# Patient Record
Sex: MALE
[De-identification: names, ages, dates, MRNs, and addresses within clinical notes are randomized per-mention and may not be internally consistent; named-entity substitution may affect disease eponyms.]

---

## 2020-08-18 ENCOUNTER — APPOINTMENT (OUTPATIENT)
Dept: UROLOGY | Facility: CLINIC | Age: 80
End: 2020-08-18
Payer: MEDICARE

## 2020-09-01 ENCOUNTER — APPOINTMENT (OUTPATIENT)
Dept: UROLOGY | Facility: CLINIC | Age: 80
End: 2020-09-01
Payer: MEDICARE

## 2020-09-01 VITALS — DIASTOLIC BLOOD PRESSURE: 77 MMHG | SYSTOLIC BLOOD PRESSURE: 152 MMHG | TEMPERATURE: 97.2 F | HEART RATE: 59 BPM

## 2020-09-01 PROCEDURE — 99213 OFFICE O/P EST LOW 20 MIN: CPT

## 2020-09-01 NOTE — ASSESSMENT
[FreeTextEntry1] : \par 1. MRI pelvis at [facility]\par 2. PSA today\par 3. F/U as TEB to discuss results and review images to finalize plan\par \par Thank you very much for allowing me to assist in the care of this patient. Should you have any additional questions or concerns please do not hesitate to contact me.\par \par Sincerely,\par \par Rigoberto Forrest D.O.\par  of Urology and Radiology\par  of Urology at [facility]\par System Director for Prostate Cancer\par [facility] [address] [place] [address], [address]\par Phone: [phone]\par

## 2020-09-01 NOTE — HISTORY OF PRESENT ILLNESS
[FreeTextEntry1] : Thank you so much for the referral to help care for your patient.\par \par Chief Complaint: follow up, PCa GG1\par Date of first visit: 08/18/2020\par \par RACHELLE PHILLIP  is a 80 year old  man, hx of low-grade low-volume prostate cancer. \par He was last seen by me on 06/14/2019, to follow PSA in 6 months and MRI in 2 years. Before that he was seen by Dr. Adams in that same year.\par \par His cancer Elizabeth 6 was diagnosed in July 2013. Only 6 cores were obtained. Specimens were labeled left and right side of the prostate. \par \par Had three biopsies total: 2013, 2016, 2019. Decipher is average risk and PSA <10 94.8 ) last biopsy in 2016. The targeted biopsy was positive for [place] 3+3 PCA, 05/31/2019 (lesion left apex PZpl)\par Repeat biopsy, MR Fusion targeted was performed on 06/05/2019.\par \par The targeted biopsy was positive for Elizabeth 3+3 cancer (lesion Left [place] PZpl), 2.5 mm 10%.\par The standard biopsy detected Elizabeth 3+3 cancer which did overlap with the targeted biopsy. 4/12 cores. LLB (GG1, 8 mm), LLM (25%, 5mm) LBM (GG1, 6.5 mm), LMM (GG1, 1.5 mm) locations. Previous 2016 biopsy 03/12 cores +target positive. I would consider this stable disease. LLA-ASAP, RLB-HGPIN.\par \par PSA hx:\par \par 2.9 05/2019\par 3.3 02/2019\par 3.1 11/2018\par 3.2 06/2018\par 4.1 01/2018\par 4.8 08/2017\par 14.8 07/2017\par 3.80 02/2017\par 3.38 05/2016\par 3.45 04/2016\par 3.95 05/2015\par 2.30 (corrected) 09/2014\par \par Outside [facility] MRI dated November 2013. Prostate volume was 32 cc. There is one lesion located in the left peripheral zone apex. The overall suspicion was 4/5. Second lesion located in the left peripheral zone mid-gland with a score of 3/5.\par \par Followup MRI dated August 15, 2014. Prostate one is 22 cc. There was one suspicious lesion located at the left apex peripheral zone measuring 6 x 4 x 7 with overall suspicion of 3/5. The second is located in the right peripheral zone apex measuring 6 x 4 x 8 mm the overall suspicious 2/5. There's no signs of extraprostatic disease lymphadenopathy and/or bony involvement. The patient does have small bilateral inguinal hernias containing only fat. The images were reviewed and discussed with the patient appeared. Prior pathology reviewed left side positive no specific location.\par MRI 2/2019, 30 cc, PSA 0.10.\par \par MRI 04/10/2015 ([facility]) 27 cc, PSAD 0.08. PiRADS 3 lesion measuring 7 mm, in the left apex PZ. PiRADS 2 lesion measuring 6 mm in the right apex PZ. unchanged prom previous. No EPE, No LDN.\par \par Since last visit patient reports doing well. Not on Flomax now. \par PVR now is 110 mL. \par 08/18/2020 \par IPSS 7 QOL 1 \par HANNA 22\par \par \par The patient denies fevers, chills, nausea and or vomiting and no unexplained weight loss.\par \par All pertinent laboratory, films and physician notes were reviewed.  Questionnaire results were discussed with patient.\par \par

## 2020-09-11 LAB — PSA SERPL-MCNC: 5.75 NG/ML

## 2021-01-29 ENCOUNTER — APPOINTMENT (OUTPATIENT)
Dept: MRI IMAGING | Facility: IMAGING CENTER | Age: 81
End: 2021-01-29

## 2021-04-10 ENCOUNTER — RESULT REVIEW (OUTPATIENT)
Age: 81
End: 2021-04-10

## 2021-04-10 ENCOUNTER — OUTPATIENT (OUTPATIENT)
Dept: OUTPATIENT SERVICES | Facility: HOSPITAL | Age: 81
LOS: 1 days | End: 2021-04-10
Payer: MEDICARE

## 2021-04-10 ENCOUNTER — APPOINTMENT (OUTPATIENT)
Dept: MRI IMAGING | Facility: IMAGING CENTER | Age: 81
End: 2021-04-10
Payer: MEDICARE

## 2021-04-10 DIAGNOSIS — C61 MALIGNANT NEOPLASM OF PROSTATE: ICD-10-CM

## 2021-04-10 PROCEDURE — 72197 MRI PELVIS W/O & W/DYE: CPT | Mod: 26,ME

## 2021-04-10 PROCEDURE — 76377 3D RENDER W/INTRP POSTPROCES: CPT | Mod: 26

## 2021-04-10 PROCEDURE — 72197 MRI PELVIS W/O & W/DYE: CPT

## 2021-04-10 PROCEDURE — G1004: CPT

## 2021-04-10 PROCEDURE — A9585: CPT

## 2021-04-10 PROCEDURE — 76377 3D RENDER W/INTRP POSTPROCES: CPT

## 2021-04-13 ENCOUNTER — NON-APPOINTMENT (OUTPATIENT)
Age: 81
End: 2021-04-13

## 2021-04-18 ENCOUNTER — LABORATORY RESULT (OUTPATIENT)
Age: 81
End: 2021-04-18

## 2021-04-19 ENCOUNTER — APPOINTMENT (OUTPATIENT)
Dept: UROLOGY | Facility: CLINIC | Age: 81
End: 2021-04-19
Payer: MEDICARE

## 2021-04-19 VITALS — HEART RATE: 59 BPM | SYSTOLIC BLOOD PRESSURE: 145 MMHG | TEMPERATURE: 97.3 F | DIASTOLIC BLOOD PRESSURE: 75 MMHG

## 2021-04-19 PROCEDURE — 99214 OFFICE O/P EST MOD 30 MIN: CPT

## 2021-04-19 NOTE — ASSESSMENT
[FreeTextEntry1] : 80 year old low risk CAP on multiple biopsies (3 total).  Select Mdx 2019 was 70% HG risk, and biopsy was done but only GG1 detected.  Now with MRI changes we would move forward with a biopsy.  (2.6 cm right mid pzpm) lesion.\par \par IRB Notification\par \par The patient has been made aware of the opportunity to participate in our MR US fusion guided biopsy trial testing the next generation biopsy technology.  This is an IRB approved trial (IRB #).  He is already being consented for a standard MR US fusion guided biopsy therefore there is no change in his clinical work flow.  He has been given a copy of the consent to review before the biopsy date and given an opportunity to contact us with any further questions.  He will be consented on the day of the biopsy procedure.\par \par He understands that many men with prostate cancer will die with the disease rather than of it and we also discussed the results large multi-center American and  prostate cancer screening trials. He also understands that PSA in and of itself does not diagnose prostate cancer but only assesses risk to a certain degree. The patient understands that to definitively screen for prostate cancer, a biopsy is required and this procedure has risks, including bleeding, infection, ED and urinary retention. The patient opted to move forward with the biopsy.\par \par The patient is aware to expect hematuria x 2 weeks and upto 4 weeks of hematospermia.  There is a risk of infection albeit much lower than a transrectal approach. In some cases patients can experience erectile dysfunction but this is usually self limiting.  Any fever/chills after the biopsy the patient is to contact the office and go to the ER for an immediate evaluation. He has been given paper instructions outlining these items - which includes medications to avoid prior to surgery.\par \par 1. CBC, BMP, PSA, Covid Test, UA UCx\par 2. Med Clearance\par 3. TP biopsy at [facility]\par 4. follow up 2 weeks after biopsy with his primary urologist or ourselves.\par 5. we will call with the path results once they are resulted.\par \par \par \par Thank you very much for allowing me to assist in the care of this patient. Should you have any additional questions or concerns please do not hesitate to contact me.\par \par Sincerely,\par \par Art R. Rastinehad D.O.\par  of Urology and Radiology\par  of Urology at [facility]\par System Director for Prostate Cancer\par [address] [place] [address], [address]\par Phone: [phone]\par

## 2021-04-19 NOTE — DISEASE MANAGEMENT
[0-10] : 0 -10 ng/mL [6] : [place] Score 6 [] : Patient had a Prostate MRI [3] : 3 [I] : I [Active Surveillance] : Active Surveillance [BiopsyDate] : 07/13 [TotalCores] : 6 [TotalPositiveCores] : 1 [LastPSADate] : 09/20 [LastPSAResults] : 5.75 [LastMRIDate] : 04/21 [LastMRIResults] : 2 suspicious prostate lesions, PIRADS 3 [FirstSurveillanceBiopsyDate] : 2016 [2SurveillanceBiopsyDate] : 05/19 [2SurveillanceBiopsyResults] : HGPIN LLB, LLB 3+3, LLM 3+3, LLA ASAP, LMB 3+3, LMM 3+3, TARGET 1 left apex pzpl 3+3

## 2021-04-19 NOTE — PHYSICAL EXAM
[General Appearance - Well Developed] : well developed [General Appearance - Well Nourished] : well nourished [Normal Appearance] : normal appearance [Well Groomed] : well groomed [General Appearance - In No Acute Distress] : no acute distress [Heart Rate And Rhythm] : Heart rate and rhythm were normal [] : no respiratory distress [Abdomen Soft] : soft [Abdomen Tenderness] : non-tender [Costovertebral Angle Tenderness] : no ~M costovertebral angle tenderness [Normal Station and Gait] : the gait and station were normal for the patient's age [Skin Color & Pigmentation] : normal skin color and pigmentation [No Focal Deficits] : no focal deficits [Oriented To Time, Place, And Person] : oriented to person, place, and time [Affect] : the affect was normal [Mood] : the mood was normal [Not Anxious] : not anxious [Inguinal Lymph Nodes Enlarged Bilaterally] : inguinal [FreeTextEntry1] : does have small amt of psoriasis.

## 2021-04-19 NOTE — DISEASE MANAGEMENT
[0-10] : 0 -10 ng/mL [6] : [place] Score 6 [] : Patient had a Prostate MRI [3] : 3 [I] : I [Active Surveillance] : Active Surveillance [BiopsyDate] : 07/13 [TotalCores] : 6 [TotalPositiveCores] : 1 [LastPSADate] : 09/20 [LastPSAResults] : 5.75 [LastMRIDate] : 04/21 [LastMRIResults] : 2 suspicious prostate lesions, PIRADS 3 [FirstSurveillanceBiopsyDate] : 2016 [2SurveillanceBiopsyDate] : 05/19 [2SurveillanceBiopsyResults] : HGPIN LLB, LLB 3+3, LLM 3+3, LLA ASAP, LMB 3+3, LMM 3+3, TARGET 1 left apex pzpl 3+3

## 2021-04-19 NOTE — HISTORY OF PRESENT ILLNESS
[FreeTextEntry1] : Thank you so much for the referral to help care for your patient.\par \par Chief Complaint: follow up, PCa GG1\par Date of first visit: 08/18/2020\par \par RACHELLE PHILLIP  is a 80 year old  man, hx of low-grade low-volume prostate cancer. \par He was last seen by me on 06/14/2019, to follow PSA in 6 months and MRI in 2 years. he did have a select Mdx 70% HG risk disease.  The MRi demonstrated sig change from prior scan in 2019, which the biopsy was done due to the Select Mdx.\par \par MRI 4/13/21 - Prostate Volume 31 cc and two lesions.  The left mid pzpl lesion has been present for years but has not changed.  The right mdid pzpl/pm lesion which appears to be a sig change from prior scans.  There wa a small area appreciated on the prior scan at that has possibly extended through the right PZ.  No EPE or LAD or Waldo disease.\par \par His cancer Elizabeth 6 was diagnosed in July 2013. Only 6 cores were obtained. Specimens were labeled left and right side of the prostate. \par \par Had three biopsies total: 2013, 2016, 2019. Decipher is average risk and PSA <10 94.8 ) last biopsy in 2016. The targeted biopsy was positive for [place] 3+3 PCA, 05/31/2019 (lesion left apex PZpl)\par Repeat biopsy, MR Fusion targeted was performed on 06/05/2019.\par \par 4/2019 - The targeted biopsy was positive for Elizabeth 3+3 cancer (lesion Left [place] PZpl), 2.5 mm 10%.\par The standard biopsy detected Elizabeth 3+3 cancer which did overlap with the targeted biopsy. 4/12 cores. LLB (GG1, 8 mm), LLM (25%, 5mm) LBM (GG1, 6.5 mm), LMM (GG1, 1.5 mm) locations. Previous 2016 biopsy 03/12 cores +target positive. I would consider this stable disease. LLA-ASAP, RLB-HGPIN.\par \par PSA hx:\par \par 2.9 05/2019\par 3.3 02/2019\par 3.1 11/2018\par 3.2 06/2018\par 4.1 01/2018\par 4.8 08/2017\par 14.8 07/2017\par 3.80 02/2017\par 3.38 05/2016\par 3.45 04/2016\par 3.95 05/2015\par 2.30 (corrected) 09/2014\par \par Since last visit patient reports doing well. Not on Flomax now. \par PVR now is 110 mL. \par 08/18/2020 \par IPSS 7 QOL 1 \par HANNA 22\par \par \par The patient denies fevers, chills, nausea and or vomiting and no unexplained weight loss.\par \par All pertinent laboratory, films and physician notes were reviewed.  Questionnaire results were discussed with patient.\par \par

## 2021-04-19 NOTE — HISTORY OF PRESENT ILLNESS
[FreeTextEntry1] : Thank you so much for the referral to help care for your patient.\par \par Chief Complaint: follow up, PCa GG1\par Date of first visit: 08/18/2020\par \par RACHELLE PHILLIP  is a 80 year old  man, hx of low-grade low-volume prostate cancer. \par He was last seen by me on 06/14/2019, to follow PSA in 6 months and MRI in 2 years. he did have a select Mdx 70% HG risk disease.  The MRi demonstrated sig change from prior scan in 2019, which the biopsy was done due to the Select Mdx.\par \par MRI 4/13/21 - Prostate Volume 31 cc and two lesions.  The left mid pzpl lesion has been present for years but has not changed.  The right mdid pzpl/pm lesion which appears to be a sig change from prior scans.  There wa a small area appreciated on the prior scan at that has possibly extended through the right PZ.  No EPE or LAD or Waldo disease.\par \par His cancer Elizabeth 6 was diagnosed in July 2013. Only 6 cores were obtained. Specimens were labeled left and right side of the prostate. \par \par Had three biopsies total: 2013, 2016, 2019. Decipher is average risk and PSA <10 94.8 ) last biopsy in 2016. The targeted biopsy was positive for [place] 3+3 PCA, 05/31/2019 (lesion left apex PZpl)\par Repeat biopsy, MR Fusion targeted was performed on 06/05/2019.\par \par 4/2019 - The targeted biopsy was positive for Elizabeth 3+3 cancer (lesion Left [place] PZpl), 2.5 mm 10%.\par The standard biopsy detected Elizabeth 3+3 cancer which did overlap with the targeted biopsy. 4/12 cores. LLB (GG1, 8 mm), LLM (25%, 5mm) LBM (GG1, 6.5 mm), LMM (GG1, 1.5 mm) locations. Previous 2016 biopsy 03/12 cores +target positive. I would consider this stable disease. LLA-ASAP, RLB-HGPIN.\par \par PSA hx:\par \par 2.9 05/2019\par 3.3 02/2019\par 3.1 11/2018\par 3.2 06/2018\par 4.1 01/2018\par 4.8 08/2017\par 14.8 07/2017\par 3.80 02/2017\par 3.38 05/2016\par 3.45 04/2016\par 3.95 05/2015\par 2.30 (corrected) 09/2014\par \par Since last visit patient reports doing well. Not on Flomax now. \par PVR now is 110 mL. \par 08/18/2020 \par IPSS 7 QOL 1 \par HANNA 22\par \par \par The patient denies fevers, chills, nausea and or vomiting and no unexplained weight loss.\par \par All pertinent laboratory, films and physician notes were reviewed.  Questionnaire results were discussed with patient.\par \par

## 2021-04-19 NOTE — ASSESSMENT
[FreeTextEntry1] : 80 year old low risk CAP on multiple biopsies (3 total).  Select Mdx 2019 was 70% HG risk, and biopsy was done but only GG1 detected.  Now with MRI changes we would move forward with a biopsy.  (2.6 cm right mid pzpm) lesion.\par \par IRB Notification\par \par The patient has been made aware of the opportunity to participate in our MR US fusion guided biopsy trial testing the next generation biopsy technology.  This is an IRB approved trial (IRB #).  He is already being consented for a standard MR US fusion guided biopsy therefore there is no change in his clinical work flow.  He has been given a copy of the consent to review before the biopsy date and given an opportunity to contact us with any further questions.  He will be consented on the day of the biopsy procedure.\par \par He understands that many men with prostate cancer will die with the disease rather than of it and we also discussed the results large multi-center American and  prostate cancer screening trials. He also understands that PSA in and of itself does not diagnose prostate cancer but only assesses risk to a certain degree. The patient understands that to definitively screen for prostate cancer, a biopsy is required and this procedure has risks, including bleeding, infection, ED and urinary retention. The patient opted to move forward with the biopsy.\par \par The patient is aware to expect hematuria x 2 weeks and upto 4 weeks of hematospermia.  There is a risk of infection albeit much lower than a transrectal approach. In some cases patients can experience erectile dysfunction but this is usually self limiting.  Any fever/chills after the biopsy the patient is to contact the office and go to the ER for an immediate evaluation. He has been given paper instructions outlining these items - which includes medications to avoid prior to surgery.\par \par 1. CBC, BMP, PSA, Covid Test, UA UCx\par 2. Med Clearance\par 3. TP biopsy at [facility]\par 4. follow up 2 weeks after biopsy with his primary urologist or ourselves.\par 5. we will call with the path results once they are resulted.\par \par \par \par Thank you very much for allowing me to assist in the care of this patient. Should you have any additional questions or concerns please do not hesitate to contact me.\par \par Sincerely,\par \par Art R. Rastinehad D.O.\par  of Urology and Radiology\par  of Urology at [facility]\par System Director for Prostate Cancer\par [address] [place] [address], [address]\par Phone: [phone]\par

## 2021-04-20 ENCOUNTER — NON-APPOINTMENT (OUTPATIENT)
Age: 81
End: 2021-04-20

## 2021-04-20 LAB
ANION GAP SERPL CALC-SCNC: 9 MMOL/L
APPEARANCE: CLEAR
BACTERIA: NEGATIVE
BILIRUBIN URINE: NEGATIVE
BLOOD URINE: NEGATIVE
BUN SERPL-MCNC: 19 MG/DL
CALCIUM SERPL-MCNC: 9.5 MG/DL
CHLORIDE SERPL-SCNC: 103 MMOL/L
CO2 SERPL-SCNC: 26 MMOL/L
COLOR: NORMAL
CREAT SERPL-MCNC: 1.08 MG/DL
GLUCOSE QUALITATIVE U: NEGATIVE
GLUCOSE SERPL-MCNC: 85 MG/DL
HYALINE CASTS: 0 /LPF
KETONES URINE: NEGATIVE
LEUKOCYTE ESTERASE URINE: NEGATIVE
MICROSCOPIC-UA: NORMAL
NITRITE URINE: NEGATIVE
PH URINE: 6.5
POTASSIUM SERPL-SCNC: 4.7 MMOL/L
PROTEIN URINE: NEGATIVE
PSA FREE FLD-MCNC: 9 %
PSA FREE SERPL-MCNC: 0.58 NG/ML
PSA SERPL-MCNC: 6.58 NG/ML
RED BLOOD CELLS URINE: 1 /HPF
SODIUM SERPL-SCNC: 138 MMOL/L
SPECIFIC GRAVITY URINE: 1.01
SQUAMOUS EPITHELIAL CELLS: 0 /HPF
UROBILINOGEN URINE: NORMAL
WHITE BLOOD CELLS URINE: 0 /HPF

## 2021-04-21 LAB
BACTERIA UR CULT: NORMAL
BASOPHILS # BLD AUTO: 0 K/UL
BASOPHILS NFR BLD AUTO: 0 %
EOSINOPHIL # BLD AUTO: 0 K/UL
EOSINOPHIL NFR BLD AUTO: 0 %
HCT VFR BLD CALC: 40.3 %
HGB BLD-MCNC: 12.8 G/DL
LYMPHOCYTES # BLD AUTO: 19.36 K/UL
LYMPHOCYTES NFR BLD AUTO: 82 %
MAN DIFF?: NORMAL
MCHC RBC-ENTMCNC: 31.1 PG
MCHC RBC-ENTMCNC: 31.8 GM/DL
MCV RBC AUTO: 98.1 FL
MONOCYTES # BLD AUTO: 0.47 K/UL
MONOCYTES NFR BLD AUTO: 2 %
NEUTROPHILS # BLD AUTO: 3.07 K/UL
NEUTROPHILS NFR BLD AUTO: 13 %
PLATELET # BLD AUTO: NORMAL K/UL
RBC # BLD: 4.11 M/UL
RBC # FLD: 16 %
WBC # FLD AUTO: 23.61 K/UL

## 2022-04-03 ENCOUNTER — RESULT REVIEW (OUTPATIENT)
Age: 82
End: 2022-04-03

## 2022-04-03 ENCOUNTER — OUTPATIENT (OUTPATIENT)
Dept: OUTPATIENT SERVICES | Facility: HOSPITAL | Age: 82
LOS: 1 days | End: 2022-04-03
Payer: MEDICARE

## 2022-04-03 ENCOUNTER — APPOINTMENT (OUTPATIENT)
Dept: MRI IMAGING | Facility: IMAGING CENTER | Age: 82
End: 2022-04-03
Payer: MEDICARE

## 2022-04-03 DIAGNOSIS — C61 MALIGNANT NEOPLASM OF PROSTATE: ICD-10-CM

## 2022-04-03 PROCEDURE — 76498P: CUSTOM | Mod: 26,MH

## 2022-04-03 PROCEDURE — 72197 MRI PELVIS W/O & W/DYE: CPT | Mod: MG

## 2022-04-03 PROCEDURE — A9585: CPT

## 2022-04-03 PROCEDURE — G1004: CPT

## 2022-04-03 PROCEDURE — 76498 UNLISTED MR PROCEDURE: CPT

## 2022-04-03 PROCEDURE — 72197 MRI PELVIS W/O & W/DYE: CPT | Mod: 26,MG

## 2022-04-05 ENCOUNTER — NON-APPOINTMENT (OUTPATIENT)
Age: 82
End: 2022-04-05

## 2022-04-11 ENCOUNTER — NON-APPOINTMENT (OUTPATIENT)
Age: 82
End: 2022-04-11

## 2022-04-13 ENCOUNTER — LABORATORY RESULT (OUTPATIENT)
Age: 82
End: 2022-04-13

## 2022-04-13 ENCOUNTER — APPOINTMENT (OUTPATIENT)
Dept: UROLOGY | Facility: CLINIC | Age: 82
End: 2022-04-13
Payer: MEDICARE

## 2022-04-13 VITALS — DIASTOLIC BLOOD PRESSURE: 74 MMHG | TEMPERATURE: 97.7 F | SYSTOLIC BLOOD PRESSURE: 145 MMHG | HEART RATE: 75 BPM

## 2022-04-13 DIAGNOSIS — R39.9 UNSPECIFIED SYMPTOMS AND SIGNS INVOLVING THE GENITOURINARY SYSTEM: ICD-10-CM

## 2022-04-13 DIAGNOSIS — C61 MALIGNANT NEOPLASM OF PROSTATE: ICD-10-CM

## 2022-04-13 PROCEDURE — 99214 OFFICE O/P EST MOD 30 MIN: CPT

## 2022-04-13 NOTE — ADDENDUM
[FreeTextEntry1] : I, Dr. Forrest, personally performed the evaluation and management (E/M) services for this established patient who presents today with (a) new problem(s)/exacerbation of (an) existing condition(s).  That E/M includes conducting the examination, assessing all new/exacerbated conditions, and establishing a new plan of care.  Today, my ACP, Fátima Toribio, was here to observe my evaluation and management services for this new problem/exacerbated condition to be followed going forward.\par \par IRB   MR/TRUS FUSION GUIDED PROSTATE BIOPSY- AN IMPROVED WAY TO DETECT AND QUANTIFY PROSTATE CANCER\par \par Inclusion criteria have been reviewed and it has been determined that the subject has met all inclusion criteria.\par Exclusion criteria have been reviewed and it has been determined that the subject does not meet any exclusion criteria.\par \par The following was discussed during the consent process:\par ·	The fact that the study involves research\par ·	The study schedule and procedures involved\par ·	The main risks of the study, and the fact that all risks may not be known at this time\par ·	New information that may affect the subject’s willingness to continue on the study will be presented as soon as it is available\par ·	Benefits of participating\par ·	Alternatives to participating\par ·	Confidentiality \par ·	Compensation for research-related injury\par ·	Contacts for questions about the study or their rights while on the study\par ·	The fact that the subject’s participation is voluntary - they can refuse or withdraw \par at any time without penalty or loss of benefits.\par \par The subject was given ample time to ask questions prior to signing - all questions were answered to the subject’s satisfaction.\par \par Contact information for research staff was given to the subject.	\par The subject has expressed his/her willingness to participate.	\par \par Opportunity to sign the consent electronically was offered to the subject. Study team to contact the subject to assist with e-consenting though the [email or web address] platform. \par \par OR  \par \par Subject will sign printed consent on day of procedure.  \par \par \par

## 2022-04-13 NOTE — PHYSICAL EXAM
[General Appearance - Well Developed] : well developed [General Appearance - Well Nourished] : well nourished [Normal Appearance] : normal appearance [Well Groomed] : well groomed [General Appearance - In No Acute Distress] : no acute distress [Costovertebral Angle Tenderness] : no ~M costovertebral angle tenderness [Skin Color & Pigmentation] : normal skin color and pigmentation [Heart Rate And Rhythm] : Heart rate and rhythm were normal [Oriented To Time, Place, And Person] : oriented to person, place, and time [Affect] : the affect was normal [Mood] : the mood was normal [Not Anxious] : not anxious [Normal Station and Gait] : the gait and station were normal for the patient's age [No Focal Deficits] : no focal deficits [Inguinal Lymph Nodes Enlarged Bilaterally] : inguinal [Exaggerated Use Of Accessory Muscles For Inspiration] : no accessory muscle use [Edema] : no peripheral edema present [Abdomen Soft] : soft [Abdomen Tenderness] : non-tender [] : no hepato-splenomegaly [Normal] : normal external genitalia without lesions and no testicular masses [Normal] : oriented to person, place and time, the affect was normal, the mood was normal and not anxious [FreeTextEntry1] : firmness on left no discrete nodules

## 2022-04-13 NOTE — DISEASE MANAGEMENT
[0-10] : 0 -10 ng/mL [Biopsy] : Patient had a biopsy on [6] : Template Biopsy Elizabeth Score: 6 [I] : I [Active Surveillance] : Active Surveillance [BiopsyDate] : 7/2013 [TotalCores] : 6 [TotalPositiveCores] : 1 [LastPSADate] : 4/19/21 [LastPSAResults] : 6.58 [LastIDate] : 4/2022 [LastMRIResults] : PIRADS 5 and PIRADS 4 lesion [FirstSurveillanceBiopsyDate] : 2016 [2SurveillanceBiopsyDate] : 5/2019 [2SurveillanceBiopsyResults] : HGPIN LLB, LLB 3+3, LLM 3+3, LLA ASAP, LMB 3+3, LMM 3+3, TARGET 1 left apex pzpl 3+3

## 2022-04-13 NOTE — HISTORY OF PRESENT ILLNESS
[FreeTextEntry1] : Thank you so much for the referral to help care for your patient.\par \par Chief Complaint: follow up, PCa GG1\par Date of first visit: 08/18/2020\par \par RACHELLE PHILLIP  is a 81 year old  man with GG1 prostate cancer. He was diagnosed in July 2013. hx of low-grade low-volume prostate cancer. He had a follow up biopsy in 2019 and most recently on 5/31/2019 with fusion. The standard biopsy detected GG1 (4/12 cores, LLB, LLM, LMB, LMM). The targeted biopsy detected GG1 cancer (left apex pzpl). PSA at diagnosis in 2013 was ~2 ng/ml. His most recent PSA is 6.58 ng/ml. MRI on 4/3/22 demonstrated a PIRADS 5 lesion (right pz base to midgland) and PIRADS 4 lesion (left base pzpl).\par \par Decipher is average risk.\par \par Has been experiencing urinary frequency x3 weeks. FOS is more variable and experiencing double voiding. Denies dysuria or hematuria. Takes 0.4mg Flomax.\par \par Select Mdx 2019 was 70% HG risk\par \par PSA hx:\par 6.58 4/2022\par 2.9 05/2019\par 3.3 02/2019\par 3.1 11/2018\par 3.2 06/2018\par 4.1 01/2018\par 4.8 08/2017\par 14.8 07/2017\par 3.80 02/2017\par 3.38 05/2016\par 3.45 04/2016\par 3.95 05/2015\par 2.30 (corrected) 09/2014\par \par MRI Hx\par MRI at [facility] on 4/3/2022.  38 cc prostate with PIRADS 5 lesion measuring 26 X 10 X 22mm, right entire transverse plan base to midgland peripheral zone; PIRADS 4 lesion measuring 10 x 6 x 9mm, left base pzpl.  No LAD No EPE, No Bony Lesions.  The images have been reviewed and clinical implications discussed with the patient. These are the same lesions that have been present for years and are stable.\par \par MRI 4/13/21 - Prostate Volume 31 cc and two lesions. The left mid pzpl lesion has been present for years but has not changed. The right mid pzpl/pm lesion which appears to be a sig change from prior scans. There wa a small area appreciated on the prior scan at that has possibly extended through the right PZ. No EPE or LAD or Waldo disease.\par \par MRI 2/2019, 30 cc, PSA 0.10.\par \par MRI 04/10/2015 ([facility]) 27 cc, PSAD 0.08. PiRADS 3 lesion measuring 7 mm, in the left apex PZ. PiRADS 2 lesion measuring 6 mm in the right apex PZ. unchanged prom previous. No EPE, No LDN.\par \par Followup MRI dated August 15, 2014. Prostate one is 22 cc. There was one suspicious lesion located at the left apex peripheral zone measuring 6 x 4 x 7 with overall suspicion of 3/5. The second is located in the right peripheral zone apex measuring 6 x 4 x 8 mm the overall suspicious 2/5. There's no signs of extraprostatic disease lymphadenopathy and/or bony involvement. The patient does have small bilateral inguinal hernias containing only fat. The images were reviewed and discussed with the patient appeared. Prior pathology reviewed left side positive no specific location.\par \par Outside [facility] MRI dated November 2013. Prostate volume was 32 cc. There is one lesion located in the left peripheral zone apex. The overall suspicion was 4/5. Second lesion located in the left peripheral zone mid-gland with a score of 3/5.\par \par Biopsy Hx\par 2019-with Dr Forrest\par RLB- HGPIN\par LLB- [place] 6, 75%, 8mm\par LLM- Elizabeth 6, 25%, 5mm\par LLA- ASAP\par LMB- Elizabeth 6, 50%, 6.5mm\par LMM- [place] 6, 10%, 1.5mm\par Target 1- left apex pzpl- Elizabeth 6, 10%, 2.5mm\par \par 2016\par \par 2013-slides reviewed by Yulisa Aviles 5/22/14- Elizabeth 6, 10%, 2mm\par \par 04/13/2022\par IPSS 9 QOL 3\par HANNA 21\par \par 08/18/2020 \par IPSS 7 QOL 1 \par HANNA 22\par \par The patient denies fevers, chills, nausea and or vomiting and no unexplained weight loss.\par \par All pertinent laboratory, films and physician notes were reviewed.  Questionnaire results were discussed with patient.

## 2022-04-13 NOTE — ASSESSMENT
[FreeTextEntry1] : 81 year old  man with GG1 prostate cancer. He was diagnosed in July 2013. hx of low-grade low-volume prostate cancer. He had a follow up biopsy in 2019 and most recently on 5/31/2019 with fusion. The standard biopsy detected GG1 (4/12 cores, LLB, LLM, LMB, LMM). The targeted biopsy detected GG1 cancer (left apex pzpl). PSA at diagnosis in 2013 was ~2 ng/ml. His most recent PSA is 6.58 ng/ml. MRI on 4/3/22 demonstrated a PIRADS 5 lesion (right pz base to midgland) and PIRADS 4 lesion (left base pzpl). A biopsy was recommended last year. Now with MRI changes from last year. Continue to recommend.\par \par 1. He would like to see what the 4K score is first (had done last week)- he know this does not change the plan given he already has cancer. We will discuss once resulted\par 2. Book for biopsy\par 3. Worsening LUTS- UA, UCx. He will consider increasing to 0.8mg\par \par He understands that many men with prostate cancer will die with the disease rather than of it and we also discussed the results large multi-center [place] and  prostate cancer screening trials. He also understands that PSA in and of itself does not diagnose prostate cancer but only assesses risk to a certain degree. The patient understands that to definitively screen for prostate cancer, a biopsy is required and this procedure has risks, including bleeding, infection, ED and urinary retention. The patient opted to move forward with the biopsy.\par \par The patient is aware to expect hematuria x 2 weeks and upto 4 weeks of hematospermia. There is a risk of infection albeit much lower than a transrectal approach. In some cases patients can experience erectile dysfunction but this is usually self limiting. Any fever/chills after the biopsy the patient is to contact the office and go to the ER for an immediate evaluation. He has been given paper instructions outlining these items - which includes medications to avoid prior to surgery.\par \par 1. CBC, BMP, PSA, Covid Test, UA UCx. EKG\par 2. Med Clearance\par 3. TP biopsy at [facility]\par 4. follow up 2 weeks after biopsy with his primary urologist or ourselves.\par 5. we will call with the path results once they are resulted.\par \par \par \par Thank you very much for allowing me to assist in the care of this patient. Should you have any additional questions or concerns please do not hesitate to contact me.\par \par Sincerely,\par \par Rigoberto Forrest D.O.\par  of Urology and Radiology\par  of Urology at [facility]\par System Director for Prostate Cancer\par [address] [place] [address], [address]\par Phone: [phone]

## 2022-04-14 ENCOUNTER — NON-APPOINTMENT (OUTPATIENT)
Age: 82
End: 2022-04-14

## 2022-04-14 LAB
ANION GAP SERPL CALC-SCNC: 12 MMOL/L
APPEARANCE: CLEAR
BACTERIA: NEGATIVE
BASOPHILS # BLD AUTO: 0.06 K/UL
BASOPHILS NFR BLD AUTO: 0.2 %
BILIRUBIN URINE: NEGATIVE
BLOOD URINE: NEGATIVE
BUN SERPL-MCNC: 25 MG/DL
CALCIUM SERPL-MCNC: 9.1 MG/DL
CHLORIDE SERPL-SCNC: 107 MMOL/L
CO2 SERPL-SCNC: 22 MMOL/L
COLOR: YELLOW
CREAT SERPL-MCNC: 1.08 MG/DL
EGFR: 69 ML/MIN/1.73M2
EOSINOPHIL # BLD AUTO: 0.16 K/UL
EOSINOPHIL NFR BLD AUTO: 0.6 %
GLUCOSE QUALITATIVE U: NEGATIVE
GLUCOSE SERPL-MCNC: 96 MG/DL
HCT VFR BLD CALC: 42 %
HGB BLD-MCNC: 13.5 G/DL
HYALINE CASTS: 0 /LPF
IMM GRANULOCYTES NFR BLD AUTO: 0.1 %
KETONES URINE: NEGATIVE
LEUKOCYTE ESTERASE URINE: NEGATIVE
LYMPHOCYTES # BLD AUTO: 25.22 K/UL
LYMPHOCYTES NFR BLD AUTO: 88.6 %
MAN DIFF?: NORMAL
MCHC RBC-ENTMCNC: 32.1 GM/DL
MCHC RBC-ENTMCNC: 32.5 PG
MCV RBC AUTO: 101.2 FL
MICROSCOPIC-UA: NORMAL
MONOCYTES # BLD AUTO: 0.29 K/UL
MONOCYTES NFR BLD AUTO: 1 %
NEUTROPHILS # BLD AUTO: 2.72 K/UL
NEUTROPHILS NFR BLD AUTO: 9.5 %
NITRITE URINE: NEGATIVE
PH URINE: 6
PLATELET # BLD AUTO: NORMAL K/UL
POTASSIUM SERPL-SCNC: 4.7 MMOL/L
PROTEIN URINE: NEGATIVE
PSA SERPL-MCNC: 9.01 NG/ML
RBC # BLD: 4.15 M/UL
RBC # FLD: 16.8 %
RED BLOOD CELLS URINE: 1 /HPF
SODIUM SERPL-SCNC: 141 MMOL/L
SPECIFIC GRAVITY URINE: 1.01
SQUAMOUS EPITHELIAL CELLS: 0 /HPF
UROBILINOGEN URINE: NORMAL
WBC # FLD AUTO: 28.48 K/UL
WHITE BLOOD CELLS URINE: 0 /HPF

## 2022-04-18 LAB — BACTERIA UR CULT: NORMAL

## 2022-04-19 ENCOUNTER — NON-APPOINTMENT (OUTPATIENT)
Age: 82
End: 2022-04-19

## 2022-05-19 ENCOUNTER — NON-APPOINTMENT (OUTPATIENT)
Age: 82
End: 2022-05-19

## 2022-05-26 ENCOUNTER — APPOINTMENT (OUTPATIENT)
Dept: UROLOGY | Facility: AMBULATORY SURGERY CENTER | Age: 82
End: 2022-05-26

## 2022-05-26 ENCOUNTER — NON-APPOINTMENT (OUTPATIENT)
Age: 82
End: 2022-05-26

## 2022-06-08 ENCOUNTER — NON-APPOINTMENT (OUTPATIENT)
Age: 82
End: 2022-06-08